# Patient Record
Sex: FEMALE | Race: WHITE | URBAN - METROPOLITAN AREA
[De-identification: names, ages, dates, MRNs, and addresses within clinical notes are randomized per-mention and may not be internally consistent; named-entity substitution may affect disease eponyms.]

---

## 2022-12-14 ENCOUNTER — OFFICE VISIT (OUTPATIENT)
Dept: URBAN - METROPOLITAN AREA CLINIC 92 | Facility: CLINIC | Age: 32
End: 2022-12-14

## 2022-12-15 ENCOUNTER — OFFICE VISIT (OUTPATIENT)
Dept: URBAN - METROPOLITAN AREA CLINIC 92 | Facility: CLINIC | Age: 32
End: 2022-12-15
Payer: COMMERCIAL

## 2022-12-15 ENCOUNTER — WEB ENCOUNTER (OUTPATIENT)
Dept: URBAN - METROPOLITAN AREA CLINIC 92 | Facility: CLINIC | Age: 32
End: 2022-12-15

## 2022-12-15 ENCOUNTER — DASHBOARD ENCOUNTERS (OUTPATIENT)
Age: 32
End: 2022-12-15

## 2022-12-15 VITALS
SYSTOLIC BLOOD PRESSURE: 129 MMHG | HEIGHT: 64 IN | HEART RATE: 73 BPM | DIASTOLIC BLOOD PRESSURE: 85 MMHG | BODY MASS INDEX: 25.2 KG/M2 | TEMPERATURE: 97.3 F | WEIGHT: 147.6 LBS

## 2022-12-15 DIAGNOSIS — R11.0 NAUSEA: ICD-10-CM

## 2022-12-15 DIAGNOSIS — R10.13 EPIGASTRIC PAIN: ICD-10-CM

## 2022-12-15 DIAGNOSIS — R19.7 DIARRHEA, UNSPECIFIED TYPE: ICD-10-CM

## 2022-12-15 PROCEDURE — 99204 OFFICE O/P NEW MOD 45 MIN: CPT

## 2022-12-15 RX ORDER — SUCRALFATE 1 G/1
1 TABLET ON AN EMPTY STOMACH TABLET ORAL TWICE A DAY
Qty: 60 | Refills: 2 | OUTPATIENT
Start: 2022-12-15 | End: 2023-03-15

## 2022-12-15 RX ORDER — CITALOPRAM 40 MG/1
0.5 TABLET TABLET, FILM COATED ORAL ONCE A DAY
Status: ACTIVE | COMMUNITY

## 2022-12-15 RX ORDER — CIPROFLOXACIN 500 MG/5ML
2.5 ML KIT ORAL
Status: ACTIVE | COMMUNITY

## 2022-12-15 RX ORDER — DESOGESTREL AND ETHINYL ESTRADIOL 21-5 (28)
AS DIRECTED KIT ORAL
Status: ACTIVE | COMMUNITY

## 2022-12-15 RX ORDER — ONDANSETRON 8 MG/1
1 TABLET ON THE TONGUE AND ALLOW TO DISSOLVE  AS NEEDED TABLET, ORALLY DISINTEGRATING ORAL ONCE A DAY
Status: ACTIVE | COMMUNITY

## 2022-12-15 RX ORDER — ESOMEPRAZOLE MAGNESIUM 40 MG/1
1 CAPSULE CAPSULE, DELAYED RELEASE ORAL ONCE A DAY
Qty: 30 CAPSULE | Refills: 2 | OUTPATIENT
Start: 2022-12-15

## 2022-12-15 RX ORDER — SUCRALFATE 1 G/1
1 TABLET ON AN EMPTY STOMACH TABLET ORAL TWICE A DAY
Status: ACTIVE | COMMUNITY

## 2022-12-15 RX ORDER — ESOMEPRAZOLE SODIUM 40 MG/5ML
AS DIRECTED INJECTION INTRAVENOUS
Status: ACTIVE | COMMUNITY

## 2022-12-15 NOTE — PHYSICAL EXAM CONSTITUTIONAL:
normal,  alert,  in no acute distress,  well developed, well nourished,  ambulating without difficulty,  normal communication ability
details…

## 2022-12-15 NOTE — HPI-TODAY'S VISIT:
32-year-old female patient presents today with complaints of epigastric abdominal pain and diarrhea.  She states symptoms started few days after she had Thanksgiving dinner at [employer].  Most of her family got sick right after consuming the food but her sx started about a week after. Pain is present in the epigastric area and radiates to right and left upper quadrant.  Palpating makes the pain worse.  She has had some increased nausea and back pain as well.  Denies vomiting.  Pain is worse with spicy foods.  She denies GERD, dysphagia, odynophagia, recent travel.  She does take aspirin twice a week.  She is currently on sucralfate 4 times daily and  esomeprazole 40 mg for 3 days which has been helping her pain.   She presented to her primary care who obtained a CT and MRI:  CT A/P w/ contrast 12/1/22: marked wall thickening of a left abd jejunal segment w/o evidence of obstruction. diff includes regional enteritis or sequelae of prior IBD, neoplastic infiltration is considered less likely but no excludes-small bowel lymphoma could have this appearance f/u with MRI, non obstructing bilateral nephrolithiasis  MRI w/o contrast 12/9/22: mild pectus deformity w/ mass effect on heart o/w normal  She does have a follow-up with cardiology regarding these results as she is also experiencing shortness of breath. She was placed on Cipro twice daily for 7 days due to enteritis. Labs obtained were normal.  Has been having 3-4 bowel movements that are looser than normal.  Prior to this she would have 1 bowel movement daily or every other day which was solid.  Denies hematochezia, melena.  She has lost 3 pounds in the last week since she is eating less.  Family history of colorectal cancer diagnosed in her paternal aunt in her early 40s.

## 2023-01-10 ENCOUNTER — TELEPHONE ENCOUNTER (OUTPATIENT)
Dept: URBAN - METROPOLITAN AREA CLINIC 92 | Facility: CLINIC | Age: 33
End: 2023-01-10

## 2023-01-11 ENCOUNTER — OFFICE VISIT (OUTPATIENT)
Dept: URBAN - METROPOLITAN AREA SURGERY CENTER 16 | Facility: SURGERY CENTER | Age: 33
End: 2023-01-11

## 2023-02-10 ENCOUNTER — OFFICE VISIT (OUTPATIENT)
Dept: URBAN - METROPOLITAN AREA CLINIC 92 | Facility: CLINIC | Age: 33
End: 2023-02-10

## 2023-02-10 RX ORDER — ESOMEPRAZOLE SODIUM 40 MG/5ML
AS DIRECTED INJECTION INTRAVENOUS
Status: ACTIVE | COMMUNITY

## 2023-02-10 RX ORDER — SUCRALFATE 1 G/1
1 TABLET ON AN EMPTY STOMACH TABLET ORAL TWICE A DAY
Status: ACTIVE | COMMUNITY

## 2023-02-10 RX ORDER — CITALOPRAM 40 MG/1
0.5 TABLET TABLET, FILM COATED ORAL ONCE A DAY
Status: ACTIVE | COMMUNITY

## 2023-02-10 RX ORDER — ESOMEPRAZOLE MAGNESIUM 40 MG/1
1 CAPSULE CAPSULE, DELAYED RELEASE ORAL ONCE A DAY
Qty: 30 CAPSULE | Refills: 2 | Status: ACTIVE | COMMUNITY
Start: 2022-12-15

## 2023-02-10 RX ORDER — SUCRALFATE 1 G/1
1 TABLET ON AN EMPTY STOMACH TABLET ORAL TWICE A DAY
Qty: 60 | Refills: 2 | Status: ACTIVE | COMMUNITY
Start: 2022-12-15 | End: 2023-03-15

## 2023-02-10 RX ORDER — CIPROFLOXACIN 500 MG/5ML
2.5 ML KIT ORAL
Status: ACTIVE | COMMUNITY

## 2023-02-10 RX ORDER — DESOGESTREL AND ETHINYL ESTRADIOL 21-5 (28)
AS DIRECTED KIT ORAL
Status: ACTIVE | COMMUNITY

## 2023-02-10 RX ORDER — ONDANSETRON 8 MG/1
1 TABLET ON THE TONGUE AND ALLOW TO DISSOLVE  AS NEEDED TABLET, ORALLY DISINTEGRATING ORAL ONCE A DAY
Status: ACTIVE | COMMUNITY

## 2023-02-10 NOTE — HPI-TODAY'S VISIT:
32-year-old female patient presents today with complaints of epigastric abdominal pain and diarrhea.  She states symptoms started few days after she had Thanksgiving dinner at [employer].  Most of her family got sick right after consuming the food but her sx started about a week after. Pain is present in the epigastric area and radiates to right and left upper quadrant.  Palpating makes the pain worse.  She has had some increased nausea and back pain as well.  Denies vomiting.  Pain is worse with spicy foods.  She denies GERD, dysphagia, odynophagia, recent travel.  She does take aspirin twice a week.  She is currently on sucralfate 4 times daily and  esomeprazole 40 mg for 3 days which has been helping her pain.   She presented to her primary care who obtained a CT and MRI:  CT A/P w/ contrast 12/1/22: marked wall thickening of a left abd jejunal segment w/o evidence of obstruction. diff includes regional enteritis or sequelae of prior IBD, neoplastic infiltration is considered less likely but no excludes-small bowel lymphoma could have this appearance f/u with MRI, non obstructing bilateral nephrolithiasis  MRI w/o contrast 12/9/22: mild pectus deformity w/ mass effect on heart o/w normal  She does have a follow-up with cardiology regarding these results as she is also experiencing shortness of breath. She was placed on Cipro twice daily for 7 days due to enteritis. Labs obtained were normal.  Has been having 3-4 bowel movements that are looser than normal.  Prior to this she would have 1 bowel movement daily or every other day which was solid.  Denies hematochezia, melena.  She has lost 3 pounds in the last week since she is eating less.  Family history of colorectal cancer diagnosed in her paternal aunt in her early 40s.